# Patient Record
Sex: FEMALE | Race: WHITE
[De-identification: names, ages, dates, MRNs, and addresses within clinical notes are randomized per-mention and may not be internally consistent; named-entity substitution may affect disease eponyms.]

---

## 2017-05-06 ENCOUNTER — HOSPITAL ENCOUNTER (INPATIENT)
Dept: HOSPITAL 74 - JDEL | Age: 22
LOS: 4 days | Discharge: HOME | DRG: 560 | End: 2017-05-10
Attending: OBSTETRICS & GYNECOLOGY | Admitting: OBSTETRICS & GYNECOLOGY
Payer: COMMERCIAL

## 2017-05-06 VITALS — BODY MASS INDEX: 22.6 KG/M2

## 2017-05-06 DIAGNOSIS — Z3A.38: ICD-10-CM

## 2017-05-06 LAB
ALBUMIN SERPL-MCNC: 3 G/DL (ref 3.4–5)
ALP SERPL-CCNC: 199 U/L (ref 45–117)
ALT SERPL-CCNC: 14 U/L (ref 12–78)
ANION GAP SERPL CALC-SCNC: 9 MMOL/L (ref 8–16)
APTT BLD: 31.1 SECONDS (ref 26.9–34.4)
AST SERPL-CCNC: 15 U/L (ref 15–37)
BASOPHILS # BLD: 0.5 % (ref 0–2)
BILIRUB SERPL-MCNC: 0.2 MG/DL (ref 0.2–1)
CALCIUM SERPL-MCNC: 9.3 MG/DL (ref 8.5–10.1)
CO2 SERPL-SCNC: 22 MMOL/L (ref 21–32)
CREAT SERPL-MCNC: 0.4 MG/DL (ref 0.55–1.02)
DEPRECATED RDW RBC AUTO: 14 % (ref 11.6–15.6)
EOSINOPHIL # BLD: 0.2 % (ref 0–4.5)
GLUCOSE SERPL-MCNC: 82 MG/DL (ref 74–106)
HIV 1 & 2 AB: NEGATIVE
HIV 1 AGP24: NEGATIVE
INR BLD: 1.02 (ref 0.82–1.09)
MCH RBC QN AUTO: 29.9 PG (ref 25.7–33.7)
MCHC RBC AUTO-ENTMCNC: 33.1 G/DL (ref 32–36)
MCV RBC: 90.4 FL (ref 80–96)
NEUTROPHILS # BLD: 82.7 % (ref 42.8–82.8)
PLATELET # BLD AUTO: 236 K/MM3 (ref 134–434)
PMV BLD: 8.7 FL (ref 7.5–11.1)
PROT SERPL-MCNC: 7 G/DL (ref 6.4–8.2)
PT PNL PPP: 11.2 SEC (ref 9.98–11.88)
WBC # BLD AUTO: 15 K/MM3 (ref 4–10)

## 2017-05-06 RX ADMIN — ACETAMINOPHEN PRN MG: 325 TABLET ORAL at 23:20

## 2017-05-06 RX ADMIN — AMPICILLIN SCH MLS/HR: 1 INJECTION, POWDER, FOR SOLUTION INTRAMUSCULAR; INTRAVENOUS at 13:30

## 2017-05-06 RX ADMIN — AMPICILLIN SCH: 1 INJECTION, POWDER, FOR SOLUTION INTRAMUSCULAR; INTRAVENOUS at 21:30

## 2017-05-06 RX ADMIN — IBUPROFEN PRN MG: 600 TABLET, FILM COATED ORAL at 23:20

## 2017-05-06 RX ADMIN — AMPICILLIN SCH MLS/HR: 1 INJECTION, POWDER, FOR SOLUTION INTRAMUSCULAR; INTRAVENOUS at 17:30

## 2017-05-06 NOTE — PN
Delivery





- Delivery


Vaginal Delivery: Vacuum Extraction


Type of Anesthesia: Epidural


EBL (cc): 250





Delivery, Single Birth





- [place] Feeding Plan


Initial Plan: Exclusive breastfeeding throughout hospitalization





Remarks





- Remarks


Remarks: 


Mother exhausted after pushing for 2 hours.


Fully dilated


Fetal head at +1 position.


Midline episiotomy performed


Vacuum applied assuring no surrounding tissue trapped under the vacuum.


After 3 pulls and 2 Pop offs fetal head delivered.


Nuchal cord x 1 clamped and cut.


Baby handed to waiting neonatology nurses.


Placenta expelled spontaneously intact.


Midline episiotomy repaired in layers with 2.0 Chromic.

## 2017-05-06 NOTE — HP
Past Medical History





- Admission


Chief Complaint: Labor pain


History of Present Illness: 


22 yo  @ 38.6 weeks gestation presents c/o contractions pain. EDC 17


She denies any rupture of membrane nor vaginal bleeding.


History Source: Patient


Limitations to Obtaining History: No Limitations





- Past Medical History


...: 1


...Para: 0


...Term: 0


...: 0


...Spon : 0


...Induced : 0


...Multiple Gestation: 0


...LMP: 17


... Weeks Gestation by Dates: 38.6


...EDC by Dates: 17


...EDC by Sono: 17





- Past Surgical History


Past Surgical History: Yes: None


Hx Myomectomy: No


Hx Transabdominal Cerclage: No





- Smoking History


Smoking history: Never smoked


Have you smoked in the past 12 months: No





- Alcohol/Substance Use


Hx Alcohol Use: No





- Social History


Usual Living Arrangement: Yes: With Spouse


History of Recent Travel: No





Home Medications





- Allergies


Allergies/Adverse Reactions: 


 Allergies











Allergy/AdvReac Type Severity Reaction Status Date / Time


 


No Known Allergies Allergy   Verified 17 09:22














- Home Medications


Home Medications: 


Ambulatory Orders





Prenatal Vit No.130/Iron/FA [Prenatal Vitamins] 1 each PO DAILY 17 











Family Disease History





- Family Disease History


Family History: Unremarkable





Review of Systems





- Review of Systems


Constitutional: reports: No Symptoms


Eyes: reports: No Symptoms


HENT: reports: No Symptoms


Neck: reports: No Symptoms


Cardiovascular: reports: No Symptoms


Respiratory: reports: No Symptoms


Gastrointestinal: reports: No Symptoms


Genitourinary: reports: Pain


Breasts: reports: No Symptoms Reported


Musculoskeletal: reports: No Symptoms


Integumentary: reports: No Symptoms


Neurological: reports: No Symptoms


Endocrine: reports: No Symptoms


Hematology/Lymphatic: reports: No Symptoms


Psychiatric: reports: No Symptoms


Pain Intensity: 7





Physical Exam - Maternity


Vital Signs: 


 Vital Signs











Temperature  98.2 F   17 10:00


 


Pulse Rate  97 H  17 10:00


 


Respiratory Rate  18   17 10:00


 


Blood Pressure  122/71   17 10:00


 


O2 Sat by Pulse Oximetry (%)      











Constitutional: Yes: Well Nourished


Eyes: Yes: Conjunctiva Clear


HENT: Yes: Atraumatic


Neck: Yes: Supple


Cardiovascular: Yes: Regular Rate and Rhythm


Lungs: Clear to auscultation


Breast(s): Yes: WNL





- Abdominal Exam/OB


Number of Fetuses: Single


Fetal Presentation: Vertex


Contractions: Yes


Intensity: Moderate





- Vaginal Exam/OB


Vaginal Bleediing: No


Speculum Exam: No


Dilatation (cm): 4


Effacement (%): 80


Amniotic Membrane Status: Intact


Fetal Station: -2





- Physical Exam


Musculoskeletal: Yes: WNL


Extremities: Yes: WNL


...Motor Strength: WNL


Psychiatric: Yes: Alert, Oriented





- Labs


Lab Results: 


 CBC, BMP





 17 09:30 





 17 09:30 











Problem List





- Problems


(1) Pain during labor


Code(s): O99.89 - OTH DISEASES AND CONDITIONS COMPL PREG/CHLDBRTH


R52 - PAIN, UNSPECIFIED








Assessment/Plan


Active Labor


GBS prophylaxis


Analgesia PRN pain


Pitocin augmentation


Anticpate

## 2017-05-06 NOTE — DS
Physical Exam-GYN


Vital Signs: 


 Vital Signs











Temperature  99.6 F   17 18:00


 


Pulse Rate  103 H  17 18:45


 


Respiratory Rate  18   17 18:45


 


Blood Pressure  134/85   17 18:45


 


O2 Sat by Pulse Oximetry (%)  98   17 18:45











Constitutional: Yes: Well Nourished


Eyes: Yes: Conjunctiva Clear


HENT: Yes: Atraumatic


Neck: Yes: Supple, Trachea Midline


Cardiovascular: Yes: Regular Rate and Rhythm


Respiratory: Yes: Regular, CTA Bilaterally


Gastrointestinal: Yes: Normal Bowel Sounds


...Rectal Exam: Yes: WNL


Renal/: Yes: WNL


Pelvis: Yes: WNL


External Genitalia: Yes: Normal


Vaginal Exam: Yes: Normal


Cervix: Yes: Normal


Uterus: Yes: Normal


....Post Partum: Yes: Uterus firm, Moderate lochia serosa


Breast(s): Yes: WNL (No engorgement)


Neurological: Yes: Alert, Oriented


Psychiatric: Yes: Alert, Oriented


Labs: 


 CBC, BMP





 17 09:30 





 17 09:30 











Delivery





- Delivery


Vaginal Delivery: Spontaneous


Type of Anesthesia: Epidural





Delivery, Single Birth





- [facility] Feeding Plan


Initial Plan: Exclusive breastfeeding throughout hospitalization





Remarks





- Remarks


Remarks: 


 of a live infant 


Nose / oropharynx suctioned @ perineum


Cord clamped and cut


Placenta expelled spontaneously intact.





Discharge Summary


Reason For Visit: LABOR


Current Active Problems





Pain during labor (Acute) 








Procedures: Principal: Vacuum delivery


Hospital Course: 


Routine Postpartum care


Condition: Good





- Instructions


Diet, Activity, Other Instructions: 


Regular diet


No douching, no sexual intercourse x 6 weeks


F/U in clinic in 6 weeks


Disposition: HOME





- Home Medications


Comprehensive Discharge Medication List: 


Ambulatory Orders





Prenatal Vit No.130/Iron/FA [Prenatal Vitamins] 1 each PO DAILY 17

## 2017-05-07 LAB
DEPRECATED RDW RBC AUTO: 14.3 % (ref 11.6–15.6)
MCH RBC QN AUTO: 29.5 PG (ref 25.7–33.7)
MCHC RBC AUTO-ENTMCNC: 32.9 G/DL (ref 32–36)
MCV RBC: 89.7 FL (ref 80–96)
NEUTROPHILS # BLD: 83 % (ref 42.8–82.8)
PLATELET # BLD AUTO: 228 K/MM3 (ref 134–434)
PLATELET # BLD EST: ADEQUATE 10*3/UL
PMV BLD: 8.7 FL (ref 7.5–11.1)
WBC # BLD AUTO: 23.4 K/MM3 (ref 4–10)

## 2017-05-07 RX ADMIN — ACETAMINOPHEN PRN MG: 325 TABLET ORAL at 21:02

## 2017-05-07 RX ADMIN — FERROUS SULFATE TAB EC 324 MG (65 MG FE EQUIVALENT) SCH MG: 324 (65 FE) TABLET DELAYED RESPONSE at 09:55

## 2017-05-07 RX ADMIN — Medication SCH TAB: at 09:54

## 2017-05-07 RX ADMIN — IBUPROFEN PRN MG: 600 TABLET, FILM COATED ORAL at 06:30

## 2017-05-07 RX ADMIN — IBUPROFEN PRN MG: 600 TABLET, FILM COATED ORAL at 21:04

## 2017-05-07 RX ADMIN — ACETAMINOPHEN PRN MG: 325 TABLET ORAL at 16:22

## 2017-05-07 RX ADMIN — AMPICILLIN SCH: 1 INJECTION, POWDER, FOR SOLUTION INTRAMUSCULAR; INTRAVENOUS at 01:30

## 2017-05-07 RX ADMIN — DOCUSATE SODIUM,SENNOSIDES PRN TABLET: 50; 8.6 TABLET, FILM COATED ORAL at 21:05

## 2017-05-07 RX ADMIN — FERROUS SULFATE TAB EC 324 MG (65 MG FE EQUIVALENT) SCH: 324 (65 FE) TABLET DELAYED RESPONSE at 17:16

## 2017-05-07 RX ADMIN — IBUPROFEN PRN MG: 600 TABLET, FILM COATED ORAL at 10:37

## 2017-05-07 RX ADMIN — IBUPROFEN PRN MG: 600 TABLET, FILM COATED ORAL at 16:23

## 2017-05-07 RX ADMIN — CEFAZOLIN SCH MLS/HR: 1 INJECTION, POWDER, FOR SOLUTION INTRAVENOUS at 17:51

## 2017-05-07 RX ADMIN — FERROUS SULFATE TAB EC 324 MG (65 MG FE EQUIVALENT) SCH MG: 324 (65 FE) TABLET DELAYED RESPONSE at 17:50

## 2017-05-07 NOTE — PN
Post Partum Progress Note





- Subjective


Subjective: 


22 yo Para 1, status post vacuum delivery, seen and evaluated.


She's afebrile.


She's bottle feeding.


Post Partum Day: 1


Type of Delivery: Vacuum Assist Vag Del


Vital Signs: 


 Vital Signs











Temperature  98.0 F   05/07/17 09:00


 


Pulse Rate  88   05/07/17 09:00


 


Respiratory Rate  20   05/07/17 09:00


 


Blood Pressure  119/76   05/07/17 09:00


 


O2 Sat by Pulse Oximetry (%)  97   05/06/17 20:15











Breast Exam: Yes: Soft


Uterus: Yes: Fundus Firm


Abdomen/GI: Yes: Abdomen soft, Tolerating PO


Lochia: Yes: Rubra


Lochia, amount: Moderate


Extremities: Yes: Calves non-tender


Perineum: Yes: Episiotomy (Swollen perineum)


Activity: Ambulating





- Labs


Labs: 


 CBC











WBC  23.4 K/mm3 (4.0-10.0)  H D 05/07/17  08:10    


 


RBC  4.02 M/mm3 (3.60-5.2)   05/07/17  08:10    


 


Hgb  11.9 GM/dL (10.7-15.3)   05/07/17  08:10    


 


Hct  36.1 % (32.4-45.2)   05/07/17  08:10    


 


MCV  89.7 fl (80-96)   05/07/17  08:10    


 


MCHC  32.9 g/dl (32.0-36.0)   05/07/17  08:10    


 


RDW  14.3 % (11.6-15.6)   05/07/17  08:10    


 


Plt Count  228 K/MM3 (134-434)   05/07/17  08:10    


 


MPV  8.7 fl (7.5-11.1)   05/07/17  08:10    


 


Neutrophils %  83.0 % (42.8-82.8)  H  05/07/17  08:10    


 


Lymphocytes %  14.0 % (8-40)   05/07/17  08:10    


 


Monocytes %  3.0 % (3.8-10.2)  L  05/07/17  08:10    


 


Eosinophils %  0.2 % (0-4.5)   05/06/17  09:30    


 


Basophils %  0.5 % (0-2.0)   05/06/17  09:30    


 


Differential Comment  Manual diff done   05/07/17  08:10    


 


Platelet Estimate  Adequate  (NORMAL)   05/07/17  08:10    


 


RBC Morphology  Appears normal   05/07/17  08:10    


 


Morphology Comment  Slide scanned   05/07/17  08:10    














Problem List





- Problems


(1) Pain during labor


Code(s): O99.89 - OTH DISEASES AND CONDITIONS COMPL PREG/CHLDBRTH


R52 - PAIN, UNSPECIFIED








Assessment/Plan


Status post vacuum delivery


Swollen perineum


Leukocytosis


Navarrete catheter


IV Ancef

## 2017-05-08 LAB
BASOPHILS # BLD: 0.3 % (ref 0–2)
DEPRECATED RDW RBC AUTO: 14.2 % (ref 11.6–15.6)
EOSINOPHIL # BLD: 0.7 % (ref 0–4.5)
MCH RBC QN AUTO: 30.3 PG (ref 25.7–33.7)
MCHC RBC AUTO-ENTMCNC: 33.5 G/DL (ref 32–36)
MCV RBC: 90.5 FL (ref 80–96)
NEUTROPHILS # BLD: 79.1 % (ref 42.8–82.8)
PLATELET # BLD AUTO: 205 K/MM3 (ref 134–434)
PMV BLD: 8.3 FL (ref 7.5–11.1)
WBC # BLD AUTO: 13.8 K/MM3 (ref 4–10)

## 2017-05-08 RX ADMIN — ACETAMINOPHEN PRN MG: 325 TABLET ORAL at 21:37

## 2017-05-08 RX ADMIN — Medication SCH TAB: at 09:30

## 2017-05-08 RX ADMIN — DOCUSATE SODIUM,SENNOSIDES PRN TABLET: 50; 8.6 TABLET, FILM COATED ORAL at 21:36

## 2017-05-08 RX ADMIN — ACETAMINOPHEN PRN MG: 325 TABLET ORAL at 07:47

## 2017-05-08 RX ADMIN — IBUPROFEN PRN MG: 600 TABLET, FILM COATED ORAL at 21:36

## 2017-05-08 RX ADMIN — FERROUS SULFATE TAB EC 324 MG (65 MG FE EQUIVALENT) SCH MG: 324 (65 FE) TABLET DELAYED RESPONSE at 07:49

## 2017-05-08 RX ADMIN — FERROUS SULFATE TAB EC 324 MG (65 MG FE EQUIVALENT) SCH MG: 324 (65 FE) TABLET DELAYED RESPONSE at 12:05

## 2017-05-08 RX ADMIN — ACETAMINOPHEN PRN MG: 325 TABLET ORAL at 16:09

## 2017-05-08 RX ADMIN — ACETAMINOPHEN PRN MG: 325 TABLET ORAL at 05:25

## 2017-05-08 RX ADMIN — ACETAMINOPHEN PRN MG: 325 TABLET ORAL at 12:04

## 2017-05-08 RX ADMIN — CEFAZOLIN SCH MLS/HR: 1 INJECTION, POWDER, FOR SOLUTION INTRAVENOUS at 01:25

## 2017-05-08 RX ADMIN — IBUPROFEN PRN MG: 600 TABLET, FILM COATED ORAL at 12:04

## 2017-05-08 RX ADMIN — IBUPROFEN PRN MG: 600 TABLET, FILM COATED ORAL at 05:25

## 2017-05-08 RX ADMIN — FERROUS SULFATE TAB EC 324 MG (65 MG FE EQUIVALENT) SCH MG: 324 (65 FE) TABLET DELAYED RESPONSE at 17:42

## 2017-05-08 RX ADMIN — IBUPROFEN PRN MG: 600 TABLET, FILM COATED ORAL at 07:49

## 2017-05-08 RX ADMIN — CEFAZOLIN SCH MLS/HR: 1 INJECTION, POWDER, FOR SOLUTION INTRAVENOUS at 09:31

## 2017-05-08 RX ADMIN — IBUPROFEN PRN MG: 600 TABLET, FILM COATED ORAL at 16:10

## 2017-05-08 NOTE — PN
Post Partum Progress Note





- Subjective


Subjective: 


20 yo Para 1 status post vacuum delivery, seen and evaluated.


She c/o perineal pain. She tolerates PO diet.


Type of Delivery: Vacuum Assist Vag Del


Vital Signs: 


 Vital Signs











Temperature  98.7 F   05/08/17 05:29


 


Pulse Rate  82   05/08/17 05:29


 


Respiratory Rate  20   05/08/17 05:29


 


Blood Pressure  112/69   05/08/17 05:29


 


O2 Sat by Pulse Oximetry (%)  97   05/06/17 20:15











Breast Exam: Yes: Soft


Uterus: Yes: Fundus Firm


Abdomen/GI: Yes: Abdomen soft, Tolerating PO


Lochia: Yes: Rubra


Lochia, amount: Small


Extremities: Yes: Calves non-tender


Perineum: Yes: Episiotomy (Swollen perineum)


Activity: Other (She's lying in bed)





- Labs


Labs: 


 CBC











WBC  23.4 K/mm3 (4.0-10.0)  H D 05/07/17  08:10    


 


RBC  4.02 M/mm3 (3.60-5.2)   05/07/17  08:10    


 


Hgb  11.9 GM/dL (10.7-15.3)   05/07/17  08:10    


 


Hct  36.1 % (32.4-45.2)   05/07/17  08:10    


 


MCV  89.7 fl (80-96)   05/07/17  08:10    


 


MCHC  32.9 g/dl (32.0-36.0)   05/07/17  08:10    


 


RDW  14.3 % (11.6-15.6)   05/07/17  08:10    


 


Plt Count  228 K/MM3 (134-434)   05/07/17  08:10    


 


MPV  8.7 fl (7.5-11.1)   05/07/17  08:10    


 


Neutrophils %  83.0 % (42.8-82.8)  H  05/07/17  08:10    


 


Lymphocytes %  14.0 % (8-40)   05/07/17  08:10    


 


Monocytes %  3.0 % (3.8-10.2)  L  05/07/17  08:10    


 


Eosinophils %  0.2 % (0-4.5)   05/06/17  09:30    


 


Basophils %  0.5 % (0-2.0)   05/06/17  09:30    


 


Differential Comment  Manual diff done   05/07/17  08:10    


 


Platelet Estimate  Adequate  (NORMAL)   05/07/17  08:10    


 


RBC Morphology  Appears normal   05/07/17  08:10    


 


Morphology Comment  Slide scanned   05/07/17  08:10    














Problem List





- Problems


(1) Pain during labor


Code(s): O99.89 - OTH DISEASES AND CONDITIONS COMPL PREG/CHLDBRTH


R52 - PAIN, UNSPECIFIED








Assessment/Plan


Status post vacuum delivery


Swollen perineum


Status post IV antibiotic


Repeat CBC


Keep Navarrete catheter


Ambulation


Re-evaluate later

## 2017-05-09 LAB
BASOPHILS # BLD: 0.4 % (ref 0–2)
DEPRECATED RDW RBC AUTO: 14 % (ref 11.6–15.6)
EOSINOPHIL # BLD: 1.4 % (ref 0–4.5)
MCH RBC QN AUTO: 30.4 PG (ref 25.7–33.7)
MCHC RBC AUTO-ENTMCNC: 33.8 G/DL (ref 32–36)
MCV RBC: 90.1 FL (ref 80–96)
NEUTROPHILS # BLD: 76.2 % (ref 42.8–82.8)
PLATELET # BLD AUTO: 245 K/MM3 (ref 134–434)
PMV BLD: 8.2 FL (ref 7.5–11.1)
WBC # BLD AUTO: 10.7 K/MM3 (ref 4–10)

## 2017-05-09 RX ADMIN — ACETAMINOPHEN PRN MG: 325 TABLET ORAL at 15:15

## 2017-05-09 RX ADMIN — ACETAMINOPHEN PRN MG: 325 TABLET ORAL at 02:53

## 2017-05-09 RX ADMIN — ACETAMINOPHEN PRN MG: 325 TABLET ORAL at 09:31

## 2017-05-09 RX ADMIN — IBUPROFEN PRN MG: 600 TABLET, FILM COATED ORAL at 09:30

## 2017-05-09 RX ADMIN — IBUPROFEN PRN MG: 600 TABLET, FILM COATED ORAL at 02:54

## 2017-05-09 RX ADMIN — IBUPROFEN PRN MG: 600 TABLET, FILM COATED ORAL at 21:53

## 2017-05-09 RX ADMIN — Medication SCH TAB: at 09:32

## 2017-05-09 RX ADMIN — FERROUS SULFATE TAB EC 324 MG (65 MG FE EQUIVALENT) SCH MG: 324 (65 FE) TABLET DELAYED RESPONSE at 08:24

## 2017-05-09 RX ADMIN — ACETAMINOPHEN PRN MG: 325 TABLET ORAL at 21:54

## 2017-05-09 RX ADMIN — FERROUS SULFATE TAB EC 324 MG (65 MG FE EQUIVALENT) SCH MG: 324 (65 FE) TABLET DELAYED RESPONSE at 12:35

## 2017-05-09 RX ADMIN — ACETAMINOPHEN PRN MG: 325 TABLET ORAL at 05:59

## 2017-05-09 RX ADMIN — IBUPROFEN PRN MG: 600 TABLET, FILM COATED ORAL at 15:14

## 2017-05-09 RX ADMIN — FERROUS SULFATE TAB EC 324 MG (65 MG FE EQUIVALENT) SCH MG: 324 (65 FE) TABLET DELAYED RESPONSE at 17:30

## 2017-05-09 RX ADMIN — DOCUSATE SODIUM,SENNOSIDES PRN TABLET: 50; 8.6 TABLET, FILM COATED ORAL at 21:53

## 2017-05-09 NOTE — PN
Post Partum Progress Note





- Subjective


Subjective: 


22 yo status post vacuum delivery, seen and evaluated.


Doing well except for swollen perineum.


Post Partum Day: 3


Type of Delivery: Vacuum Assist Vag Del


Vital Signs: 


 Vital Signs











Temperature  98.5 F   05/08/17 22:00


 


Pulse Rate  107 H  05/08/17 22:00


 


Respiratory Rate  20   05/08/17 22:00


 


Blood Pressure  115/70   05/08/17 22:00


 


O2 Sat by Pulse Oximetry (%)  97   05/06/17 20:15











Breast Exam: Yes: Soft


Uterus: Yes: Fundus Firm


Abdomen/GI: Yes: Abdomen soft, Tolerating PO


Lochia: Yes: Rubra


Lochia, amount: Small


Extremities: Yes: Calves non-tender


Perineum: Yes: Episiotomy (Healing wound but swollen vulva)


Activity: Other (She's lying in bed with mosley catheter in place)





- Labs


Labs: 


 CBC











WBC  13.8 K/mm3 (4.0-10.0)  H D 05/08/17  09:30    


 


RBC  3.42 M/mm3 (3.60-5.2)  L  05/08/17  09:30    


 


Hgb  10.4 GM/dL (10.7-15.3)  L D 05/08/17  09:30    


 


Hct  30.9 % (32.4-45.2)  L  05/08/17  09:30    


 


MCV  90.5 fl (80-96)   05/08/17  09:30    


 


MCHC  33.5 g/dl (32.0-36.0)   05/08/17  09:30    


 


RDW  14.2 % (11.6-15.6)   05/08/17  09:30    


 


Plt Count  205 K/MM3 (134-434)   05/08/17  09:30    


 


MPV  8.3 fl (7.5-11.1)   05/08/17  09:30    


 


Neutrophils %  79.1 % (42.8-82.8)   05/08/17  09:30    


 


Lymphocytes %  15.4 % (8-40)   05/08/17  09:30    


 


Monocytes %  4.5 % (3.8-10.2)   05/08/17  09:30    


 


Eosinophils %  0.7 % (0-4.5)  D 05/08/17  09:30    


 


Basophils %  0.3 % (0-2.0)   05/08/17  09:30    


 


Differential Comment  Manual diff done   05/07/17  08:10    


 


Platelet Estimate  Adequate  (NORMAL)   05/07/17  08:10    


 


RBC Morphology  Appears normal   05/07/17  08:10    


 


Morphology Comment  Slide scanned   05/07/17  08:10    














Problem List





- Problems


(1) Pain during labor


Code(s): O99.89 - OTH DISEASES AND CONDITIONS COMPL PREG/CHLDBRTH


R52 - PAIN, UNSPECIFIED





(2) Status post vacuum-assisted vaginal delivery


Code(s): Z87.42 - PERSONAL HISTORY OF OTH DISEASES OF THE FEMALE GENITAL TRACT





(3) Swelling of perineal tissue


Code(s): R22.2 - LOCALIZED SWELLING, MASS AND LUMP, TRUNK








Assessment/Plan


Status post vacuum assisted vaginal delivery.


Swollen perineal tissue


D/C Mosley catheter


Ambulation


Continue observation

## 2017-05-10 VITALS — TEMPERATURE: 98.6 F | SYSTOLIC BLOOD PRESSURE: 114 MMHG | DIASTOLIC BLOOD PRESSURE: 67 MMHG | HEART RATE: 81 BPM

## 2017-05-10 RX ADMIN — FERROUS SULFATE TAB EC 324 MG (65 MG FE EQUIVALENT) SCH MG: 324 (65 FE) TABLET DELAYED RESPONSE at 17:02

## 2017-05-10 RX ADMIN — FERROUS SULFATE TAB EC 324 MG (65 MG FE EQUIVALENT) SCH MG: 324 (65 FE) TABLET DELAYED RESPONSE at 09:11

## 2017-05-10 RX ADMIN — IBUPROFEN PRN MG: 600 TABLET, FILM COATED ORAL at 14:02

## 2017-05-10 RX ADMIN — ACETAMINOPHEN PRN MG: 325 TABLET ORAL at 05:55

## 2017-05-10 RX ADMIN — FERROUS SULFATE TAB EC 324 MG (65 MG FE EQUIVALENT) SCH MG: 324 (65 FE) TABLET DELAYED RESPONSE at 12:31

## 2017-05-10 RX ADMIN — Medication SCH TAB: at 09:11

## 2017-05-10 RX ADMIN — IBUPROFEN PRN MG: 600 TABLET, FILM COATED ORAL at 02:36

## 2017-05-10 RX ADMIN — ACETAMINOPHEN PRN MG: 325 TABLET ORAL at 14:02

## 2017-05-10 RX ADMIN — ACETAMINOPHEN PRN MG: 325 TABLET ORAL at 02:36

## 2017-05-10 NOTE — PN
Post Partum Progress Note





- Subjective


Subjective: 


pt feels better


voiding without difficulty ..


no c/o perineal pain 





Post Partum Day: 4


Type of Delivery: Vacuum Assist Vag Del


Vital Signs: 


 Vital Signs











Temperature  97.7 F   05/09/17 21:31


 


Pulse Rate  86   05/09/17 21:31


 


Respiratory Rate  20   05/09/17 21:31


 


Blood Pressure  110/79   05/09/17 21:31


 


O2 Sat by Pulse Oximetry (%)  97   05/06/17 20:15











Breast Exam: Yes: Soft, Other (BF ).  No: Engorged


Uterus: Yes: Fundus Firm, Fundus below umbilicus, Non-tender


Lochia: Yes: Rubra


Lochia, amount: Moderate


Extremities: Yes: Calves non-tender


Perineum: Yes: Episiotomy (vulva edema markedly less . still present )


Activity: Ambulating





- Labs


Labs: 


 CBC











WBC  10.7 K/mm3 (4.0-10.0)  H  05/09/17  07:30    


 


RBC  3.80 M/mm3 (3.60-5.2)   05/09/17  07:30    


 


Hgb  11.6 GM/dL (10.7-15.3)  D 05/09/17  07:30    


 


Hct  34.3 % (32.4-45.2)   05/09/17  07:30    


 


MCV  90.1 fl (80-96)   05/09/17  07:30    


 


MCHC  33.8 g/dl (32.0-36.0)   05/09/17  07:30    


 


RDW  14.0 % (11.6-15.6)   05/09/17  07:30    


 


Plt Count  245 K/MM3 (134-434)   05/09/17  07:30    


 


MPV  8.2 fl (7.5-11.1)   05/09/17  07:30    


 


Neutrophils %  76.2 % (42.8-82.8)   05/09/17  07:30    


 


Lymphocytes %  17.4 % (8-40)   05/09/17  07:30    


 


Monocytes %  4.6 % (3.8-10.2)   05/09/17  07:30    


 


Eosinophils %  1.4 % (0-4.5)  D 05/09/17  07:30    


 


Basophils %  0.4 % (0-2.0)   05/09/17  07:30    


 


Differential Comment  Manual diff done   05/07/17  08:10    


 


Platelet Estimate  Adequate  (NORMAL)   05/07/17  08:10    


 


RBC Morphology  Appears normal   05/07/17  08:10    


 


Morphology Comment  Slide scanned   05/07/17  08:10    














Assessment/Plan


stable 


plan discharge today

## 2021-02-09 ENCOUNTER — HOSPITAL ENCOUNTER (OUTPATIENT)
Dept: HOSPITAL 74 - JDEL | Age: 26
Setting detail: OBSERVATION
Discharge: HOME | End: 2021-02-09
Attending: OBSTETRICS & GYNECOLOGY | Admitting: OBSTETRICS & GYNECOLOGY
Payer: COMMERCIAL

## 2021-02-09 VITALS — TEMPERATURE: 98.3 F | SYSTOLIC BLOOD PRESSURE: 112 MMHG | HEART RATE: 102 BPM | DIASTOLIC BLOOD PRESSURE: 70 MMHG

## 2021-02-09 VITALS — BODY MASS INDEX: 24 KG/M2

## 2021-02-09 DIAGNOSIS — O60.00: Primary | ICD-10-CM

## 2021-02-09 LAB
ALBUMIN SERPL-MCNC: 2.6 G/DL (ref 3.4–5)
ALP SERPL-CCNC: 77 U/L (ref 45–117)
ALT SERPL-CCNC: 17 U/L (ref 13–61)
ANION GAP SERPL CALC-SCNC: 7 MMOL/L (ref 8–16)
AST SERPL-CCNC: 17 U/L (ref 15–37)
BASOPHILS # BLD: 0.3 % (ref 0–2)
BASOPHILS # BLD: 0.3 % (ref 0–2)
BILIRUB SERPL-MCNC: 0.4 MG/DL (ref 0.2–1)
BUN SERPL-MCNC: 5.8 MG/DL (ref 7–18)
CALCIUM SERPL-MCNC: 8.4 MG/DL (ref 8.5–10.1)
CHLORIDE SERPL-SCNC: 108 MMOL/L (ref 98–107)
CO2 SERPL-SCNC: 23 MMOL/L (ref 21–32)
CREAT SERPL-MCNC: 0.3 MG/DL (ref 0.55–1.3)
DEPRECATED RDW RBC AUTO: 18.6 % (ref 11.6–15.6)
DEPRECATED RDW RBC AUTO: 18.9 % (ref 11.6–15.6)
EOSINOPHIL # BLD: 0.4 % (ref 0–4.5)
EOSINOPHIL # BLD: 0.7 % (ref 0–4.5)
GLUCOSE SERPL-MCNC: 81 MG/DL (ref 74–106)
HCT VFR BLD CALC: 17.4 % (ref 32.4–45.2)
HCT VFR BLD CALC: 24.6 % (ref 32.4–45.2)
HGB BLD-MCNC: 5.4 GM/DL (ref 10.7–15.3)
HGB BLD-MCNC: 7.9 GM/DL (ref 10.7–15.3)
LYMPHOCYTES # BLD: 16.4 % (ref 8–40)
LYMPHOCYTES # BLD: 19.2 % (ref 8–40)
MCH RBC QN AUTO: 23.4 PG (ref 25.7–33.7)
MCH RBC QN AUTO: 25.8 PG (ref 25.7–33.7)
MCHC RBC AUTO-ENTMCNC: 31.1 G/DL (ref 32–36)
MCHC RBC AUTO-ENTMCNC: 32 G/DL (ref 32–36)
MCV RBC: 75.1 FL (ref 80–96)
MCV RBC: 80.7 FL (ref 80–96)
MONOCYTES # BLD AUTO: 6 % (ref 3.8–10.2)
MONOCYTES # BLD AUTO: 7.3 % (ref 3.8–10.2)
NEUTROPHILS # BLD: 72.5 % (ref 42.8–82.8)
NEUTROPHILS # BLD: 76.9 % (ref 42.8–82.8)
PLATELET # BLD AUTO: 252 K/MM3 (ref 134–434)
PLATELET # BLD AUTO: 314 K/MM3 (ref 134–434)
PMV BLD: 8.1 FL (ref 7.5–11.1)
PMV BLD: 8.5 FL (ref 7.5–11.1)
POTASSIUM SERPLBLD-SCNC: 3.8 MMOL/L (ref 3.5–5.1)
PROT SERPL-MCNC: 6.4 G/DL (ref 6.4–8.2)
RBC # BLD AUTO: 2.32 M/MM3 (ref 3.6–5.2)
RBC # BLD AUTO: 3.05 M/MM3 (ref 3.6–5.2)
SODIUM SERPL-SCNC: 139 MMOL/L (ref 136–145)
WBC # BLD AUTO: 10.9 K/MM3 (ref 4–10)
WBC # BLD AUTO: 8.2 K/MM3 (ref 4–10)

## 2021-02-09 PROCEDURE — P9058 RBC, L/R, CMV-NEG, IRRAD: HCPCS

## 2021-02-09 PROCEDURE — G0378 HOSPITAL OBSERVATION PER HR: HCPCS

## 2021-03-10 ENCOUNTER — HOSPITAL ENCOUNTER (OUTPATIENT)
Dept: HOSPITAL 74 - JINFUSION | Age: 26
Discharge: HOME | End: 2021-03-10
Attending: OBSTETRICS & GYNECOLOGY
Payer: COMMERCIAL

## 2021-03-10 VITALS — DIASTOLIC BLOOD PRESSURE: 58 MMHG | HEART RATE: 104 BPM | SYSTOLIC BLOOD PRESSURE: 104 MMHG

## 2021-03-10 VITALS — TEMPERATURE: 98.6 F

## 2021-03-10 DIAGNOSIS — D50.9: Primary | ICD-10-CM

## 2021-03-10 PROCEDURE — 3E033GC INTRODUCTION OF OTHER THERAPEUTIC SUBSTANCE INTO PERIPHERAL VEIN, PERCUTANEOUS APPROACH: ICD-10-PCS | Performed by: OBSTETRICS & GYNECOLOGY

## 2021-03-17 ENCOUNTER — HOSPITAL ENCOUNTER (OUTPATIENT)
Dept: HOSPITAL 74 - JINFUSION | Age: 26
Discharge: HOME | End: 2021-03-17
Attending: OBSTETRICS & GYNECOLOGY
Payer: COMMERCIAL

## 2021-03-17 VITALS — HEART RATE: 105 BPM | SYSTOLIC BLOOD PRESSURE: 111 MMHG | DIASTOLIC BLOOD PRESSURE: 62 MMHG | TEMPERATURE: 98.3 F

## 2021-03-17 DIAGNOSIS — D50.9: ICD-10-CM

## 2021-03-17 DIAGNOSIS — O99.011: Primary | ICD-10-CM

## 2021-03-17 PROCEDURE — 3E033GC INTRODUCTION OF OTHER THERAPEUTIC SUBSTANCE INTO PERIPHERAL VEIN, PERCUTANEOUS APPROACH: ICD-10-PCS | Performed by: OBSTETRICS & GYNECOLOGY

## 2021-05-11 ENCOUNTER — HOSPITAL ENCOUNTER (INPATIENT)
Dept: HOSPITAL 74 - JLDR | Age: 26
LOS: 3 days | Discharge: HOME | DRG: 560 | End: 2021-05-14
Attending: OBSTETRICS & GYNECOLOGY | Admitting: OBSTETRICS & GYNECOLOGY
Payer: COMMERCIAL

## 2021-05-11 VITALS — BODY MASS INDEX: 24.9 KG/M2

## 2021-05-11 DIAGNOSIS — Z86.2: ICD-10-CM

## 2021-05-11 DIAGNOSIS — Z3A.39: ICD-10-CM

## 2021-05-11 LAB
ANION GAP SERPL CALC-SCNC: 8 MMOL/L (ref 8–16)
APTT BLD: 29.3 SECONDS (ref 25.2–36.5)
BASOPHILS # BLD: 0.3 % (ref 0–2)
BUN SERPL-MCNC: 5.9 MG/DL (ref 7–18)
CALCIUM SERPL-MCNC: 8.6 MG/DL (ref 8.5–10.1)
CHLORIDE SERPL-SCNC: 108 MMOL/L (ref 98–107)
CO2 SERPL-SCNC: 21 MMOL/L (ref 21–32)
CREAT SERPL-MCNC: 0.4 MG/DL (ref 0.55–1.3)
DEPRECATED RDW RBC AUTO: 14 % (ref 11.6–15.6)
EOSINOPHIL # BLD: 0.4 % (ref 0–4.5)
GLUCOSE SERPL-MCNC: 79 MG/DL (ref 74–106)
HCT VFR BLD CALC: 37.7 % (ref 32.4–45.2)
HGB BLD-MCNC: 12.8 GM/DL (ref 10.7–15.3)
INR BLD: 1.04 (ref 0.83–1.09)
LYMPHOCYTES # BLD: 12.6 % (ref 8–40)
MCH RBC QN AUTO: 31.8 PG (ref 25.7–33.7)
MCHC RBC AUTO-ENTMCNC: 34 G/DL (ref 32–36)
MCV RBC: 93.3 FL (ref 80–96)
MONOCYTES # BLD AUTO: 4.9 % (ref 3.8–10.2)
NEUTROPHILS # BLD: 81.8 % (ref 42.8–82.8)
PLATELET # BLD AUTO: 175 K/MM3 (ref 134–434)
PMV BLD: 9.7 FL (ref 7.5–11.1)
PT PNL PPP: 12.6 SEC (ref 9.7–13)
RBC # BLD AUTO: 4.04 M/MM3 (ref 3.6–5.2)
SODIUM SERPL-SCNC: 138 MMOL/L (ref 136–145)
WBC # BLD AUTO: 11.9 K/MM3 (ref 4–10)

## 2021-05-11 PROCEDURE — U0005 INFEC AGEN DETEC AMPLI PROBE: HCPCS

## 2021-05-11 PROCEDURE — 3E033VJ INTRODUCTION OF OTHER HORMONE INTO PERIPHERAL VEIN, PERCUTANEOUS APPROACH: ICD-10-PCS | Performed by: OBSTETRICS & GYNECOLOGY

## 2021-05-11 PROCEDURE — G0008 ADMIN INFLUENZA VIRUS VAC: HCPCS

## 2021-05-11 PROCEDURE — C9803 HOPD COVID-19 SPEC COLLECT: HCPCS

## 2021-05-11 PROCEDURE — 10907ZC DRAINAGE OF AMNIOTIC FLUID, THERAPEUTIC FROM PRODUCTS OF CONCEPTION, VIA NATURAL OR ARTIFICIAL OPENING: ICD-10-PCS | Performed by: OBSTETRICS & GYNECOLOGY

## 2021-05-11 PROCEDURE — U0003 INFECTIOUS AGENT DETECTION BY NUCLEIC ACID (DNA OR RNA); SEVERE ACUTE RESPIRATORY SYNDROME CORONAVIRUS 2 (SARS-COV-2) (CORONAVIRUS DISEASE [COVID-19]), AMPLIFIED PROBE TECHNIQUE, MAKING USE OF HIGH THROUGHPUT TECHNOLOGIES AS DESCRIBED BY CMS-2020-01-R: HCPCS

## 2021-05-12 LAB
BASE EXCESS BLDCOA CALC-SCNC: -7.4 MMOL/L (ref 0–2)
BASE EXCESS BLDCOA CALC-SCNC: -7.6 MMOL/L (ref 0–2)
HCO3 BLDCO-SCNC: 20.3 MMHG (ref 20–29)
HCO3 BLDCO-SCNC: 20.9 MMHG (ref 20–29)
PCO2 BLDCO: 50.2 MMHG (ref 30–78)
PCO2 BLDCO: 53 MMHG (ref 30–78)
PH BLDCO: 7.21 [PH] (ref 7.14–7.44)
PH BLDCO: 7.22 [PH] (ref 7.14–7.44)

## 2021-05-12 RX ADMIN — FERROUS SULFATE TAB EC 324 MG (65 MG FE EQUIVALENT) SCH MG: 324 (65 FE) TABLET DELAYED RESPONSE at 17:20

## 2021-05-12 RX ADMIN — ACETAMINOPHEN PRN MG: 325 TABLET ORAL at 14:25

## 2021-05-12 RX ADMIN — IBUPROFEN PRN MG: 600 TABLET, FILM COATED ORAL at 14:26

## 2021-05-12 RX ADMIN — ACETAMINOPHEN PRN MG: 325 TABLET ORAL at 17:56

## 2021-05-12 RX ADMIN — ACETAMINOPHEN PRN MG: 325 TABLET ORAL at 11:02

## 2021-05-12 RX ADMIN — Medication SCH: at 11:00

## 2021-05-12 RX ADMIN — IBUPROFEN PRN MG: 600 TABLET, FILM COATED ORAL at 10:30

## 2021-05-13 LAB
BASOPHILS # BLD: 0.2 % (ref 0–2)
DEPRECATED RDW RBC AUTO: 14.3 % (ref 11.6–15.6)
EOSINOPHIL # BLD: 0.6 % (ref 0–4.5)
HCT VFR BLD CALC: 35.2 % (ref 32.4–45.2)
HGB BLD-MCNC: 12.1 GM/DL (ref 10.7–15.3)
LYMPHOCYTES # BLD: 15.1 % (ref 8–40)
MCH RBC QN AUTO: 32 PG (ref 25.7–33.7)
MCHC RBC AUTO-ENTMCNC: 34.2 G/DL (ref 32–36)
MCV RBC: 93.5 FL (ref 80–96)
MONOCYTES # BLD AUTO: 4.2 % (ref 3.8–10.2)
NEUTROPHILS # BLD: 79.9 % (ref 42.8–82.8)
PLATELET # BLD AUTO: 180 K/MM3 (ref 134–434)
PMV BLD: 9.3 FL (ref 7.5–11.1)
RBC # BLD AUTO: 3.76 M/MM3 (ref 3.6–5.2)
WBC # BLD AUTO: 17.6 K/MM3 (ref 4–10)

## 2021-05-13 RX ADMIN — ACETAMINOPHEN PRN MG: 325 TABLET ORAL at 14:36

## 2021-05-13 RX ADMIN — IBUPROFEN PRN MG: 600 TABLET, FILM COATED ORAL at 14:37

## 2021-05-13 RX ADMIN — Medication SCH TAB: at 09:37

## 2021-05-13 RX ADMIN — ACETAMINOPHEN PRN MG: 325 TABLET ORAL at 04:17

## 2021-05-13 RX ADMIN — FERROUS SULFATE TAB EC 324 MG (65 MG FE EQUIVALENT) SCH MG: 324 (65 FE) TABLET DELAYED RESPONSE at 09:37

## 2021-05-13 RX ADMIN — IBUPROFEN PRN MG: 600 TABLET, FILM COATED ORAL at 04:18

## 2021-05-13 RX ADMIN — FERROUS SULFATE TAB EC 324 MG (65 MG FE EQUIVALENT) SCH MG: 324 (65 FE) TABLET DELAYED RESPONSE at 18:15

## 2021-05-14 VITALS — TEMPERATURE: 98.1 F | DIASTOLIC BLOOD PRESSURE: 68 MMHG | HEART RATE: 82 BPM | SYSTOLIC BLOOD PRESSURE: 116 MMHG

## 2021-05-14 RX ADMIN — Medication SCH TAB: at 09:04

## 2021-05-14 RX ADMIN — ACETAMINOPHEN PRN MG: 325 TABLET ORAL at 00:13

## 2021-05-14 RX ADMIN — IBUPROFEN PRN MG: 600 TABLET, FILM COATED ORAL at 00:13

## 2021-05-14 RX ADMIN — FERROUS SULFATE TAB EC 324 MG (65 MG FE EQUIVALENT) SCH MG: 324 (65 FE) TABLET DELAYED RESPONSE at 09:03
